# Patient Record
Sex: MALE | Race: WHITE | NOT HISPANIC OR LATINO | Employment: UNEMPLOYED | URBAN - NONMETROPOLITAN AREA
[De-identification: names, ages, dates, MRNs, and addresses within clinical notes are randomized per-mention and may not be internally consistent; named-entity substitution may affect disease eponyms.]

---

## 2022-10-08 ENCOUNTER — OFFICE VISIT (OUTPATIENT)
Dept: URGENT CARE | Facility: PHYSICIAN GROUP | Age: 77
End: 2022-10-08
Payer: MEDICARE

## 2022-10-08 VITALS
OXYGEN SATURATION: 98 % | BODY MASS INDEX: 24.38 KG/M2 | TEMPERATURE: 98.9 F | RESPIRATION RATE: 18 BRPM | HEART RATE: 77 BPM | SYSTOLIC BLOOD PRESSURE: 128 MMHG | HEIGHT: 72 IN | DIASTOLIC BLOOD PRESSURE: 70 MMHG | WEIGHT: 180 LBS

## 2022-10-08 DIAGNOSIS — R07.81 PLEURITIC PAIN: ICD-10-CM

## 2022-10-08 DIAGNOSIS — R04.2 BLOODY SPUTUM: ICD-10-CM

## 2022-10-08 DIAGNOSIS — R05.3 CHRONIC COUGH: ICD-10-CM

## 2022-10-08 PROCEDURE — 99205 OFFICE O/P NEW HI 60 MIN: CPT | Performed by: NURSE PRACTITIONER

## 2022-10-08 ASSESSMENT — COPD QUESTIONNAIRES: COPD: 0

## 2022-10-08 ASSESSMENT — ENCOUNTER SYMPTOMS
FEVER: 0
MYALGIAS: 0
CHILLS: 0
COUGH: 1
SHORTNESS OF BREATH: 1
WHEEZING: 0
SORE THROAT: 0
WEIGHT LOSS: 0
VOMITING: 0
DIZZINESS: 0
NAUSEA: 0
SPUTUM PRODUCTION: 1
EYE REDNESS: 0

## 2022-10-08 NOTE — PROGRESS NOTES
Subjective:   Cas Wheeler is a 77 y.o. male who presents for Cough and Blood in Sputum      Cough  This is a chronic problem. The current episode started more than 1 year ago. The problem has been gradually worsening. The problem occurs constantly. The cough is Productive of bloody sputum. Associated symptoms include shortness of breath. Pertinent negatives include no chest pain, chills, eye redness, fever, myalgias, rash, sore throat, weight loss or wheezing. Associated symptoms comments: Pain with inhalation  . Nothing aggravates the symptoms. Risk factors for lung disease include smoking/tobacco exposure. He has tried nothing for the symptoms. The treatment provided no relief. His past medical history is significant for pneumonia. There is no history of COPD.     Review of Systems   Constitutional:  Negative for chills, fever and weight loss.   HENT:  Negative for sore throat.    Eyes:  Negative for redness.   Respiratory:  Positive for cough, sputum production and shortness of breath. Negative for wheezing.    Cardiovascular:  Negative for chest pain.   Gastrointestinal:  Negative for nausea and vomiting.   Genitourinary:  Negative for dysuria.   Musculoskeletal:  Negative for myalgias.   Skin:  Negative for rash.   Neurological:  Negative for dizziness.     Medications:    This patient does not have an active medication from one of the medication groupers.    Allergies: Patient has no known allergies.    Problem List: Cas Wheeler does not have a problem list on file.    Surgical History:  No past surgical history on file.    Past Social Hx: Cas Wheeler  reports that he has been smoking cigarettes. He has never used smokeless tobacco. He reports current alcohol use. He reports that he does not currently use drugs.     Past Family Hx:  Cas Wheeler family history is not on file.     Problem list, medications, and allergies reviewed by myself today in Epic.     Objective:     /70 (BP Location: Right arm,  Patient Position: Sitting, BP Cuff Size: Adult)   Pulse 77   Temp 37.2 °C (98.9 °F) (Temporal)   Resp 18   Ht 1.829 m (6')   Wt 81.6 kg (180 lb)   SpO2 98%   BMI 24.41 kg/m²     Physical Exam  Vitals and nursing note reviewed.   Constitutional:       General: He is not in acute distress.     Appearance: He is well-developed.   HENT:      Head: Normocephalic and atraumatic.      Right Ear: External ear normal.      Left Ear: External ear normal.      Nose: Nose normal.      Mouth/Throat:      Mouth: Mucous membranes are moist.   Eyes:      Conjunctiva/sclera: Conjunctivae normal.   Cardiovascular:      Rate and Rhythm: Normal rate.   Pulmonary:      Effort: Pulmonary effort is normal. No respiratory distress.      Breath sounds: Normal breath sounds.      Comments: Pleruritc pain    Abdominal:      General: There is no distension.   Musculoskeletal:         General: Normal range of motion.   Skin:     General: Skin is warm and dry.   Neurological:      General: No focal deficit present.      Mental Status: He is alert and oriented to person, place, and time. Mental status is at baseline.      Gait: Gait (gait at baseline) normal.   Psychiatric:         Judgment: Judgment normal.       Assessment/Plan:     Diagnosis and associated orders:   1. Chronic cough        2. Bloody sputum        3. Pleuritic pain               Comments/MDM:     X-ray imaging unavailable at clinical locationAt this time, I feel the patient requires a higher level of care including closer monitoring, stat lab work and/or imaging for further evaluation for complaints of of pleuritic pain.   this has been discussed with the patient and they state agreement and understanding.  I offered the patient an ambulance ride and  the patient is declining at this time. The patient is in no acute distress upon clinic departure and will go directly to ED without delay.                  Please note that this dictation was created using voice recognition  software. I have made a reasonable attempt to correct obvious errors, but I expect that there are errors of grammar and possibly content that I did not discover before finalizing the note.    This note was electronically signed by Mohit BEDOYA.